# Patient Record
Sex: FEMALE | Race: WHITE
[De-identification: names, ages, dates, MRNs, and addresses within clinical notes are randomized per-mention and may not be internally consistent; named-entity substitution may affect disease eponyms.]

---

## 2021-04-09 ENCOUNTER — HOSPITAL ENCOUNTER (EMERGENCY)
Dept: HOSPITAL 54 - ER | Age: 51
Discharge: HOME | End: 2021-04-09
Payer: COMMERCIAL

## 2021-04-09 VITALS — DIASTOLIC BLOOD PRESSURE: 70 MMHG | SYSTOLIC BLOOD PRESSURE: 118 MMHG

## 2021-04-09 VITALS — BODY MASS INDEX: 25.61 KG/M2 | WEIGHT: 150 LBS | HEIGHT: 64 IN

## 2021-04-09 DIAGNOSIS — R09.82: Primary | ICD-10-CM

## 2021-04-09 DIAGNOSIS — Z88.5: ICD-10-CM

## 2021-04-09 DIAGNOSIS — Z60.2: ICD-10-CM

## 2021-04-09 DIAGNOSIS — Z90.89: ICD-10-CM

## 2021-04-09 DIAGNOSIS — J45.909: ICD-10-CM

## 2021-04-09 SDOH — SOCIAL STABILITY - SOCIAL INSECURITY: PROBLEMS RELATED TO LIVING ALONE: Z60.2

## 2021-04-09 NOTE — NUR
PT BIBSELF C/O SOB S/P WAKING UP IN A PANIC. PT AAOX4 BREATHING EVENLY AND 
UNLABORED. PT STATES "I HAD A SORE THROAT X2DAYS AND WENT TO THE URGENT CARE. I 
WOKE UP TODAY PANICKED BECAUSE I FELT I COULDNT BREATHE". PT SKIN WARM, DRY AND 
INTACT. UPON ASSESSMENT PT HAS PRODUCTIVE COUGH. PT ATTACHED TO MONITOR AND 
POX. PT GIVEN BLANKET AND CALL LIGHT WITHIN REACH

## 2023-01-12 ENCOUNTER — OFFICE (OUTPATIENT)
Dept: URBAN - METROPOLITAN AREA CLINIC 45 | Facility: CLINIC | Age: 53
End: 2023-01-12

## 2023-01-12 VITALS — WEIGHT: 163 LBS | HEIGHT: 64 IN

## 2023-01-12 DIAGNOSIS — K21.9 GERD: ICD-10-CM

## 2023-01-12 DIAGNOSIS — Z12.11 ENCOUNTER FOR SCREENING FOR MALIGNANT NEOPLASM OF COLON: ICD-10-CM

## 2023-01-12 DIAGNOSIS — F41.9 ANXIETY DISORDER: ICD-10-CM

## 2023-01-12 DIAGNOSIS — Z79.899: ICD-10-CM

## 2023-01-12 DIAGNOSIS — R13.10 DYSPHAGIA: ICD-10-CM

## 2023-01-12 PROCEDURE — G0406 INPT/TELE FOLLOW UP 15: HCPCS | Performed by: STUDENT IN AN ORGANIZED HEALTH CARE EDUCATION/TRAINING PROGRAM

## 2023-01-12 PROCEDURE — 99204 OFFICE O/P NEW MOD 45 MIN: CPT | Mod: 95 | Performed by: STUDENT IN AN ORGANIZED HEALTH CARE EDUCATION/TRAINING PROGRAM

## 2023-01-12 NOTE — SERVICEHPINOTES
52-year-old female with history of DVT in 2021 (no longer on AC), anxiety who was referred for EGD for GERD and dysphagia and colonoscopy for screening. She has no prior EGD or colonoscopy. She has had GERD for the last year and takes Pepcid. She has had a few bouts of dysphagia to solids and pills in the last few months. She denies nausea or vomiting. She reports normal daily bowel movements without blood in the stool. There is no known family history of GI cancer.
roro read The patient is scheduled today for a telehealth visit, due to current mandate for social distancing on account of the COVID-19 Pandemic. The clinician is connected to a secure network. The patient consents to this teleconference being a billable service.   This visit used [email or web address] for a live, interactive audio and video telehealth visit.

## 2023-02-07 ENCOUNTER — AMBULATORY SURGICAL CENTER (OUTPATIENT)
Dept: URBAN - METROPOLITAN AREA SURGERY 28 | Facility: SURGERY | Age: 53
End: 2023-02-07

## 2023-02-07 VITALS — HEIGHT: 64 IN

## 2023-02-07 DIAGNOSIS — K22.89 OTHER SPECIFIED DISEASE OF ESOPHAGUS: ICD-10-CM

## 2023-02-07 DIAGNOSIS — R13.10 DYSPHAGIA, UNSPECIFIED: ICD-10-CM

## 2023-02-07 DIAGNOSIS — Z12.11 ENCOUNTER FOR SCREENING FOR MALIGNANT NEOPLASM OF COLON: ICD-10-CM

## 2023-02-07 DIAGNOSIS — K21.9 GASTRO-ESOPHAGEAL REFLUX DISEASE WITHOUT ESOPHAGITIS: ICD-10-CM

## 2023-02-07 DIAGNOSIS — K63.5 POLYP OF COLON: ICD-10-CM

## 2023-02-07 DIAGNOSIS — K57.30 DIVERTICULOSIS OF LARGE INTESTINE WITHOUT PERFORATION OR ABS: ICD-10-CM

## 2023-02-07 LAB — SURGICAL: PDF REPORT: (no result)

## 2023-02-07 PROCEDURE — 45380 COLONOSCOPY AND BIOPSY: CPT | Performed by: STUDENT IN AN ORGANIZED HEALTH CARE EDUCATION/TRAINING PROGRAM

## 2023-02-07 PROCEDURE — 43239 EGD BIOPSY SINGLE/MULTIPLE: CPT | Performed by: STUDENT IN AN ORGANIZED HEALTH CARE EDUCATION/TRAINING PROGRAM

## 2023-02-07 NOTE — SERVICEHPINOTES
52-year-old female with history of DVT in 2021 (no longer on AC), anxiety who was referred for EGD for GERD and dysphagia and colonoscopy for screening. She has no prior EGD or colonoscopy. She has had GERD for the last year and takes Pepcid. She has had a few bouts of dysphagia to solids and pills in the last few months. She denies nausea or vomiting. She reports normal daily bowel movements without blood in the stool. There is no known family history of GI cancer.br